# Patient Record
Sex: MALE | Race: WHITE | NOT HISPANIC OR LATINO | Employment: OTHER | URBAN - METROPOLITAN AREA
[De-identification: names, ages, dates, MRNs, and addresses within clinical notes are randomized per-mention and may not be internally consistent; named-entity substitution may affect disease eponyms.]

---

## 2023-12-14 ENCOUNTER — HOSPITAL ENCOUNTER (OUTPATIENT)
Dept: RADIOLOGY | Facility: CLINIC | Age: 78
Discharge: HOME | End: 2023-12-14
Payer: MEDICARE

## 2023-12-14 DIAGNOSIS — R97.20 ELEVATED PROSTATE SPECIFIC ANTIGEN (PSA): ICD-10-CM

## 2023-12-14 DIAGNOSIS — C61 MALIGNANT NEOPLASM OF PROSTATE (MULTI): ICD-10-CM

## 2023-12-14 PROCEDURE — 3430000001 HC RX 343 DIAGNOSTIC RADIOPHARMACEUTICALS: Performed by: UROLOGY

## 2023-12-14 PROCEDURE — 78815 PET IMAGE W/CT SKULL-THIGH: CPT | Mod: PET TUMOR SUBSQ TX STRATEGY | Performed by: STUDENT IN AN ORGANIZED HEALTH CARE EDUCATION/TRAINING PROGRAM

## 2023-12-14 PROCEDURE — 78815 PET IMAGE W/CT SKULL-THIGH: CPT | Mod: PS

## 2023-12-14 PROCEDURE — A9800 HC RX 343 DIAGNOSTIC RADIOPHARMACEUTICALS: HCPCS | Performed by: UROLOGY

## 2023-12-14 RX ADMIN — KIT FOR THE PREPARATION OF GALLIUM GA 68 GOZETOTIDE INJECTION 4.48 MILLICURIE: KIT INTRAVENOUS at 08:22

## 2024-03-08 DIAGNOSIS — K21.9 GASTRO-ESOPHAGEAL REFLUX DISEASE WITHOUT ESOPHAGITIS: ICD-10-CM

## 2024-03-11 RX ORDER — PANTOPRAZOLE SODIUM 40 MG/1
40 TABLET, DELAYED RELEASE ORAL DAILY
Qty: 100 TABLET | Refills: 2 | Status: SHIPPED | OUTPATIENT
Start: 2024-03-11

## 2025-01-13 ENCOUNTER — APPOINTMENT (OUTPATIENT)
Dept: RADIOLOGY | Facility: CLINIC | Age: 80
End: 2025-01-13
Payer: MEDICARE

## 2025-01-13 DIAGNOSIS — C61 PROSTATE CANCER (MULTI): ICD-10-CM

## 2025-01-17 ENCOUNTER — APPOINTMENT (OUTPATIENT)
Dept: RADIOLOGY | Facility: CLINIC | Age: 80
End: 2025-01-17
Payer: MEDICARE

## 2025-04-01 LAB — PSA SERPL-MCNC: 0.15 NG/ML

## 2025-04-01 NOTE — PROGRESS NOTES
Subjective   Patient ID: Wai Garland is a 80 y.o. male who presents for   HPI:  Are you experiencing:  Burning on urination --  Pain on urination  --  Urinary frequency --  Urinary urgency --  Urge incontinence --  Urinary stress incontinence  --   Number of pads used per day --  Enuresis --   Nocturia--  Hematuria --  Hesitancy --  Post void fullness --   Strength of your stream--    ROS:  General-- No C/O fever or chills  Head-- No C/O Dizziness  Eyes-- NO  C/O blurry or double vision  Ears-- No C/O hearing loss  Neck-- Supple  Chest-- No C/O pain or discomfort  Lungs-- No C/O shortness of breath  Abdomen-- No C/O  pain or discomfort, No nausea or vomiting  Back-- No C/O back pain or discomfort  Extremities-- No C/O swelling or pain    OBJECTIVE  General-- well-developed, well-nourished in NAD  Head-- normal cephalic, atraumatic  Eyes-- PERRL, EOM'S FROM, no jaundice  Neck-- Supple, without masses  Chest-- Normal bony structure  Abdomen-- soft, non tender, liver spleen not palpable. No suprapubic masses.  Back-- no flank masses palpable, no CVA tenderness on palpation or percussion  Lymph nodes-- No inguinal lymphadenopathy noted  Prostate-- +, firm, smooth, non-tender, without nodules  Testis-- both down, non-tender, without masses  Epididymis-- no masses palpable  Scrotum -- no hydrocele noted  Extremities -- Normal muscle mass and tone for the patients age  Neurological-- oriented times three    ASSESSMENT / PLAN  A:    P:  Mathieu Toney MD 04/01/25 2:42 PM

## 2025-04-04 ENCOUNTER — SPECIALTY PHARMACY (OUTPATIENT)
Dept: PHARMACY | Facility: CLINIC | Age: 80
End: 2025-04-04

## 2025-04-04 RX ORDER — LEUPROLIDE ACETATE 22.5 MG
22.5 KIT INTRAMUSCULAR
Qty: 22.5 MG | Refills: 3 | Status: SHIPPED | OUTPATIENT
Start: 2025-04-04 | End: 2025-04-07 | Stop reason: WASHOUT

## 2025-04-07 ENCOUNTER — SPECIALTY PHARMACY (OUTPATIENT)
Dept: PHARMACY | Facility: CLINIC | Age: 80
End: 2025-04-07

## 2025-04-07 DIAGNOSIS — C61 PROSTATE CANCER (MULTI): ICD-10-CM

## 2025-04-08 DIAGNOSIS — C61 PROSTATE CANCER (MULTI): ICD-10-CM

## 2025-04-08 RX ORDER — LEUPROLIDE ACETATE 22.5 MG
22.5 SYRINGE (EA) SUBCUTANEOUS
Qty: 1 EACH | Refills: 3 | Status: SHIPPED | OUTPATIENT
Start: 2025-04-08 | End: 2026-04-08

## 2025-04-09 ENCOUNTER — APPOINTMENT (OUTPATIENT)
Dept: UROLOGY | Facility: CLINIC | Age: 80
End: 2025-04-09
Payer: MEDICARE

## 2025-04-09 DIAGNOSIS — C61 PROSTATE CANCER (MULTI): Primary | ICD-10-CM

## 2025-04-19 NOTE — PROGRESS NOTES
Subjective   Patient ID: Wai Garland is a 80 y.o. male who presents for A F/U ON HIS PROSTATE CANCER.  PT S/P RADIATION THERAPY. PT HAS BEEN ON   ELIGARD  AND XTANDI HORMONE THERAPY.    PT IS ON GEMTESA 75 BID FOR HIS OVER ACTIVE BLADDER.   HPI:  Are you experiencing:  Burning on urination -- NO  Pain on urination  -- NO  Urinary frequency -- EVERY 2 HOURS  Urinary urgency --YES  Urge incontinence --YES  Urinary stress incontinence  -- YES  Number of pads used per day -- PT USES TOILET PAPER  Enuresis --  NO  Nocturia-- 3 X ON AVG  Hematuria --NO  Hesitancy --NO  Post void fullness -- OCC- PT WILL OCC DOUBLE VOID  Strength of your stream--  GOOD    ROS:  General-- No C/O fever or chills  Head-- No C/O Dizziness  Eyes-- NO  C/O blurry or double vision  Ears-- No C/O hearing loss  Neck-- Supple  Chest-- No C/O pain or discomfort  Lungs-- No C/O shortness of breath  Abdomen-- No C/O  pain or discomfort, No nausea or vomiting  Back-- No C/O back pain or discomfort  Extremities-- No C/O swelling or pain    OBJECTIVE  General-- well-developed, well-nourished in NAD  Head-- normal cephalic, atraumatic  Eyes-- PERRL, EOM'S FROM, no jaundice  Neck-- Supple, without masses  Chest-- Normal bony structure  Abdomen-- soft, non tender, liver spleen not palpable. No suprapubic masses. OBESE   Back-- no flank masses palpable, no CVA tenderness on palpation or percussion  Lymph nodes-- No inguinal lymphadenopathy noted  Prostate-- PT REFUSSED  Testis-- both down, non-tender, without masses--ATROPHIC BILATERAL  Epididymis-- no masses palpable  Scrotum -- no hydrocele noted  Extremities -- Normal muscle mass and tone for the patients age  Neurological-- oriented times three    PSA:  4/1/2025--0.15  9/18/2024--0.22  6/18/2024--0.64  5/15/2024--1.0  3/12/2024--3.25  12/5/2023--62.90  5/17/2023--2.82    12-14-23  PSMA PET SCAN:  IMPRESSION:  1. This study demonstrates metastatic prostate cancer to  retroperitoneal lymph nodes and  bones.  2. No Ga68 PSMA avidity in the prostate gland to suggest recurrent  disease  3. No Ga68 PSMA uptake in bilateral seminal vesicles to suggest  cancer infiltration  4. 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 Numerous Ga68 PSMA avid bone lesions throughout the axial and  appendicular skeleton, suggestive of widespread bone metastases.      ASSESSMENT / PLAN  A:  H/O PROSTATE CANCER TREATED WITH RADIATION THERAPY MANY YEARS AGO   PT HAS METASTATIC DISEASE TO THE RETROPERITONEAL LYMPH NODES   PT IS CURRENTLY ON TOTAL HORMONE ABLATION THERAPY - CAUSING EXTREME TIREDNESS  PSA HAS COME DOWN NICELY  P:  OBTAIN OLD RESULTS FROM [facility]  CONTINUE:  ELIGARD  EVERY THREE MONTHS  GEMTESA 150  MG / DAY-- 100 DAY SUPPLY WITH 3 REFILLS  XTANDI  80 MG / DAY--# 60 WITH 11 REFILLS  MEGACE 40 MG / DAY  F/U IN 3 MONTHS FOR A LUPRON AND PSA CK   Mathieu Toney MD 04/19/25 4:31 PM

## 2025-04-21 ENCOUNTER — APPOINTMENT (OUTPATIENT)
Age: 80
End: 2025-04-21
Payer: MEDICARE

## 2025-04-21 VITALS
BODY MASS INDEX: 30.34 KG/M2 | DIASTOLIC BLOOD PRESSURE: 79 MMHG | TEMPERATURE: 98 F | WEIGHT: 188 LBS | SYSTOLIC BLOOD PRESSURE: 129 MMHG | HEART RATE: 69 BPM

## 2025-04-21 DIAGNOSIS — R39.15 URGENCY OF MICTURITION: ICD-10-CM

## 2025-04-21 DIAGNOSIS — C61 PROSTATE CANCER (MULTI): ICD-10-CM

## 2025-04-21 DIAGNOSIS — N39.3 URINARY INCONTINENCE, STRESS, MALE: ICD-10-CM

## 2025-04-21 DIAGNOSIS — R35.0 FREQUENCY OF MICTURITION: Primary | ICD-10-CM

## 2025-04-21 DIAGNOSIS — N39.41 URGENCY INCONTINENCE: ICD-10-CM

## 2025-04-21 DIAGNOSIS — N32.81 OVERACTIVE BLADDER: ICD-10-CM

## 2025-04-21 DIAGNOSIS — R35.1 NOCTURIA: ICD-10-CM

## 2025-04-21 PROCEDURE — 96402 CHEMO HORMON ANTINEOPL SQ/IM: CPT | Performed by: UROLOGY

## 2025-04-21 PROCEDURE — 99214 OFFICE O/P EST MOD 30 MIN: CPT | Performed by: UROLOGY

## 2025-04-21 RX ORDER — ASCORBIC ACID 500 MG
1000 TABLET ORAL
COMMUNITY
Start: 2022-02-08

## 2025-04-21 RX ORDER — ENZALUTAMIDE 40 MG/1
TABLET ORAL
COMMUNITY
Start: 2025-03-25 | End: 2025-04-21 | Stop reason: SDUPTHER

## 2025-04-21 RX ORDER — CLOTRIMAZOLE AND BETAMETHASONE DIPROPIONATE 10; .64 MG/G; MG/G
CREAM TOPICAL
COMMUNITY
Start: 2020-11-04

## 2025-04-21 RX ORDER — ROSUVASTATIN CALCIUM 20 MG/1
20 TABLET, COATED ORAL DAILY
COMMUNITY

## 2025-04-21 RX ORDER — AMMONIUM LACTATE 12 G/100G
LOTION TOPICAL
COMMUNITY
Start: 2022-04-11

## 2025-04-21 RX ORDER — VIBEGRON 75 MG/1
2 TABLET, FILM COATED ORAL DAILY
Qty: 200 TABLET | Refills: 3 | Status: SHIPPED | OUTPATIENT
Start: 2025-04-21

## 2025-04-21 RX ORDER — AMLODIPINE BESYLATE 5 MG/1
5 TABLET ORAL 2 TIMES DAILY
COMMUNITY

## 2025-04-21 RX ORDER — NITROGLYCERIN 0.4 MG/1
TABLET SUBLINGUAL
COMMUNITY
Start: 2019-10-25

## 2025-04-21 RX ORDER — ACETAMINOPHEN 500 MG
5000 TABLET ORAL
COMMUNITY
Start: 2020-07-17

## 2025-04-21 RX ORDER — CARVEDILOL 12.5 MG/1
TABLET ORAL
COMMUNITY
Start: 2021-03-05

## 2025-04-21 RX ORDER — VIBEGRON 75 MG/1
2 TABLET, FILM COATED ORAL DAILY
COMMUNITY
End: 2025-04-21 | Stop reason: SDUPTHER

## 2025-04-21 RX ORDER — CYANOCOBALAMIN (VITAMIN B-12) 1000MCG/15
LIQUID (ML) ORAL
COMMUNITY
Start: 2023-06-26

## 2025-04-21 RX ORDER — MULTIVIT-MIN/FA/LYCOPEN/LUTEIN .4-300-25
TABLET ORAL
COMMUNITY
Start: 2020-11-04

## 2025-04-21 RX ORDER — VALSARTAN AND HYDROCHLOROTHIAZIDE 160; 25 MG/1; MG/1
2 TABLET ORAL DAILY
COMMUNITY

## 2025-04-21 RX ORDER — ENZALUTAMIDE 40 MG/1
80 TABLET ORAL DAILY
Qty: 60 TABLET | Refills: 0 | Status: SHIPPED | OUTPATIENT
Start: 2025-04-21

## 2025-04-21 RX ORDER — DOCUSATE SODIUM 60 MG/15ML
60 SYRUP ORAL DAILY
COMMUNITY

## 2025-04-21 RX ORDER — POTASSIUM CHLORIDE 1500 MG/1
20 TABLET, EXTENDED RELEASE ORAL DAILY
COMMUNITY

## 2025-04-21 RX ORDER — CLOPIDOGREL BISULFATE 75 MG/1
75 TABLET ORAL DAILY
COMMUNITY

## 2025-04-21 RX ORDER — MEGESTROL ACETATE 20 MG/1
TABLET ORAL DAILY
COMMUNITY
End: 2025-04-21 | Stop reason: SDUPTHER

## 2025-04-21 RX ORDER — MEGESTROL ACETATE 20 MG/1
40 TABLET ORAL 2 TIMES DAILY
Qty: 200 TABLET | Refills: 3 | Status: SHIPPED | OUTPATIENT
Start: 2025-04-21

## 2025-04-21 ASSESSMENT — ENCOUNTER SYMPTOMS
LOSS OF SENSATION IN FEET: 1
DEPRESSION: 1
OCCASIONAL FEELINGS OF UNSTEADINESS: 0

## 2025-04-21 NOTE — LETTER
April 22, 2025     Cedrick Martinez MD  [address] [id number]    Patient: Wai Garland   YOB: 1945   Date of Visit: 4/21/2025       Dear Dr. Cedrick Martinez MD:    Thank you for referring Wai Garland to me for evaluation. Below are my notes for this consultation.  If you have questions, please do not hesitate to call me. I look forward to following your patient along with you.       Sincerely,     Mathieu Toney MD      CC: No Recipients  ______________________________________________________________________________________    Subjective  Patient ID: Wai Garland is a 80 y.o. male who presents for A F/U ON HIS PROSTATE CANCER.  PT S/P RADIATION THERAPY. PT HAS BEEN ON   ELIGARD  AND XTANDI HORMONE THERAPY.    PT IS ON GEMTESA 75 BID FOR HIS OVER ACTIVE BLADDER.   HPI:  Are you experiencing:  Burning on urination -- NO  Pain on urination  -- NO  Urinary frequency -- EVERY 2 HOURS  Urinary urgency --YES  Urge incontinence --YES  Urinary stress incontinence  -- YES  Number of pads used per day -- PT USES TOILET PAPER  Enuresis --  NO  Nocturia-- 3 X ON AVG  Hematuria --NO  Hesitancy --NO  Post void fullness -- OCC- PT WILL OCC DOUBLE VOID  Strength of your stream--  GOOD    ROS:  General-- No C/O fever or chills  Head-- No C/O Dizziness  Eyes-- NO  C/O blurry or double vision  Ears-- No C/O hearing loss  Neck-- Supple  Chest-- No C/O pain or discomfort  Lungs-- No C/O shortness of breath  Abdomen-- No C/O  pain or discomfort, No nausea or vomiting  Back-- No C/O back pain or discomfort  Extremities-- No C/O swelling or pain    OBJECTIVE  General-- well-developed, well-nourished in NAD  Head-- normal cephalic, atraumatic  Eyes-- PERRL, EOM'S FROM, no jaundice  Neck-- Supple, without masses  Chest-- Normal bony structure  Abdomen-- soft, non tender, liver spleen not palpable. No suprapubic masses. OBESE   Back-- no flank masses palpable, no CVA tenderness on palpation or  percussion  Lymph nodes-- No inguinal lymphadenopathy noted  Prostate-- PT REFUSSED  Testis-- both down, non-tender, without masses--ATROPHIC BILATERAL  Epididymis-- no masses palpable  Scrotum -- no hydrocele noted  Extremities -- Normal muscle mass and tone for the patients age  Neurological-- oriented times three    PSA:  4/1/2025--0.15  9/18/2024--0.22  6/18/2024--0.64  5/15/2024--1.0  3/12/2024--3.25  12/5/2023--62.90  5/17/2023--2.82    12-14-23  PSMA PET SCAN:  IMPRESSION:  1. This study demonstrates metastatic prostate cancer to  retroperitoneal lymph nodes and bones.  2. No Ga68 PSMA avidity in the prostate gland to suggest recurrent  disease  3. No Ga68 PSMA uptake in bilateral seminal vesicles to suggest  cancer infiltration  4. 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 Numerous Ga68 PSMA avid bone lesions throughout the axial and  appendicular skeleton, suggestive of widespread bone metastases.      ASSESSMENT / PLAN  A:  H/O PROSTATE CANCER TREATED WITH RADIATION THERAPY MANY YEARS AGO   PT HAS METASTATIC DISEASE TO THE RETROPERITONEAL LYMPH NODES   PT IS CURRENTLY ON TOTAL HORMONE ABLATION THERAPY - CAUSING EXTREME TIREDNESS  PSA HAS COME DOWN NICELY  P:  OBTAIN OLD RESULTS FROM [facility]  CONTINUE:  ELIGARD  EVERY THREE MONTHS  GEMTESA 150  MG / DAY-- 100 DAY SUPPLY WITH 3 REFILLS  XTANDI  80 MG / DAY--# 60 WITH 11 REFILLS  MEGACE 40 MG / DAY  F/U IN 3 MONTHS FOR A LUPRON AND PSA CK   Mathieu Toney MD 04/19/25 4:31 PM

## 2025-05-12 DIAGNOSIS — C61 PROSTATE CANCER (MULTI): ICD-10-CM

## 2025-05-12 RX ORDER — ENZALUTAMIDE 40 MG/1
TABLET ORAL
Qty: 60 TABLET | Refills: 0 | Status: SHIPPED | OUTPATIENT
Start: 2025-05-12

## 2025-06-12 DIAGNOSIS — C61 PROSTATE CANCER (MULTI): ICD-10-CM

## 2025-06-16 RX ORDER — ENZALUTAMIDE 40 MG/1
TABLET ORAL
Qty: 60 TABLET | Refills: 11 | Status: SHIPPED | OUTPATIENT
Start: 2025-06-16

## 2025-07-22 LAB — PSA SERPL-MCNC: 0.1 NG/ML

## 2025-07-26 NOTE — PROGRESS NOTES
Subjective   Patient ID: Wai Garland is a 80 y.o. male who presents for A F/U ON HIS PROSTATE CANCER.  PT S/P RADIATION THERAPY. PT HAS BEEN ON   ELIGARD  AND XTANDI HORMONE THERAPY.    PT IS ON GEMTESA 75 BID FOR HIS OVER ACTIVE BLADDER.     HPI:  Are you experiencing:  Burning on urination -- NO  Pain on urination  -- NO  Urinary frequency -- EVERY 2 HOURS  Urinary urgency --YES  Urge incontinence --YES  Urinary stress incontinence  -- YES  Number of pads used per day -- PT USES TOILET PAPER  Enuresis --  NO  Nocturia-- 3 X ON AVG  Hematuria --NO  Hesitancy --NO  Post void fullness -- OCC- PT WILL OCC DOUBLE VOID  Strength of your stream--  GOOD     PSA:  7/21/2025-- 0.10  4/1/2025--0.15  9/18/2024--0.22  6/18/2024--0.64  5/15/2024--1.0  3/12/2024--3.25  12/5/2023--62.90  5/17/2023--2.82     12-14-23  PSMA PET SCAN:  IMPRESSION:  1. This study demonstrates metastatic prostate cancer to  retroperitoneal lymph nodes and bones.  2. No Ga68 PSMA avidity in the prostate gland to suggest recurrent  disease  3. No Ga68 PSMA uptake in bilateral seminal vesicles to suggest  cancer infiltration  4. 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 Numerous Ga68 PSMA avid bone lesions throughout the axial and  appendicular skeleton, suggestive of widespread bone metastases.        ASSESSMENT / PLAN  A:  RECORDS FROM [facility] REVIEWED   H/O PROSTATE CANCER TREATED WITH RADIATION THERAPY MANY YEARS AGO   PT HAS METASTATIC DISEASE TO THE RETROPERITONEAL LYMPH NODES   PT IS CURRENTLY ON TOTAL HORMONE ABLATION THERAPY - CAUSING EXTREME TIREDNESS  PSA HAS COME DOWN NICELY  PT IS EXTREMELY WEAK -- HAVING A VERY HARD TIME WALKING-- I AM NOT SURE IF THIS IS FROM HIS THERAPY OR IS THERE SOMETHING PRESSING ON THE L-S SPINAL CORD  ABOVE DISCUSSED IN DETAIL (  ALONG WITH OPTIONS OF FURTHER EVALUATION AND THERAPY  )  WITH THE PT  AND DAUGHTER  ALL QUESTIONS ANSWERED   P:  MRI OF  THE L-S SPINE TO LOOK FOR SPINAL CORD COMPRESSION-- DAUGHTER TO CALL FOR THE RESULTS   IF THE ABOVE IS NORMAL WILL GET THE PT  IN FOR PHYSICAL THERAPY   CONTINUE:  ELIGARD  EVERY THREE MONTHS  GEMTESA 150  MG / DAY-- 100 DAY SUPPLY WITH 3 REFILLS  XTANDI  80 MG / DAY--# 60 WITH 11 REFILLS ( IF THE MRI OF THE SPINE IS GOOD WILL STOP THE XTANDI BECAUSE I BELIEVE THIS IS WHAT IS CAUSING HIS SEVERE WEAKNESS AND LOSS OF ENERGY . )  MEGACE 40 MG / DAY  F/U IN THREE MONTHS FOR A PSA AND ELIGARD INJECTION.     Mathieu Toney MD 07/26/25 2:26 PM

## 2025-07-28 ENCOUNTER — APPOINTMENT (OUTPATIENT)
Age: 80
End: 2025-07-28
Payer: MEDICARE

## 2025-07-28 VITALS — HEART RATE: 63 BPM | SYSTOLIC BLOOD PRESSURE: 155 MMHG | DIASTOLIC BLOOD PRESSURE: 84 MMHG

## 2025-07-28 DIAGNOSIS — N32.81 OVERACTIVE BLADDER: ICD-10-CM

## 2025-07-28 DIAGNOSIS — R35.0 FREQUENCY OF MICTURITION: ICD-10-CM

## 2025-07-28 DIAGNOSIS — N39.41 URGENCY INCONTINENCE: ICD-10-CM

## 2025-07-28 DIAGNOSIS — N39.3 URINARY INCONTINENCE, STRESS, MALE: ICD-10-CM

## 2025-07-28 DIAGNOSIS — R35.1 NOCTURIA: ICD-10-CM

## 2025-07-28 DIAGNOSIS — R39.15 URGENCY OF MICTURITION: ICD-10-CM

## 2025-07-28 DIAGNOSIS — C61 PROSTATE CANCER (MULTI): Primary | ICD-10-CM

## 2025-07-28 PROCEDURE — 99213 OFFICE O/P EST LOW 20 MIN: CPT | Performed by: UROLOGY

## 2025-07-28 PROCEDURE — 96402 CHEMO HORMON ANTINEOPL SQ/IM: CPT | Performed by: UROLOGY

## 2025-07-28 RX ORDER — VIBEGRON 75 MG/1
2 TABLET, FILM COATED ORAL DAILY
Qty: 200 TABLET | Refills: 3 | Status: SHIPPED | OUTPATIENT
Start: 2025-07-28

## 2025-07-28 NOTE — LETTER
July 29, 2025     Cedrick Martinez MD  [address] [id number]    Patient: Wai Garland   YOB: 1945   Date of Visit: 7/28/2025       Dear Dr. Cedrick Martinez MD:    Thank you for referring Wai Garland to me for evaluation. Below are my notes for this consultation.  If you have questions, please do not hesitate to call me. I look forward to following your patient along with you.       Sincerely,     Mathieu Toney MD      CC: No Recipients  ______________________________________________________________________________________    Subjective  Patient ID: Wai Garland is a 80 y.o. male who presents for A F/U ON HIS PROSTATE CANCER.  PT S/P RADIATION THERAPY. PT HAS BEEN ON   ELIGARD  AND XTANDI HORMONE THERAPY.    PT IS ON GEMTESA 75 BID FOR HIS OVER ACTIVE BLADDER.     HPI:  Are you experiencing:  Burning on urination -- NO  Pain on urination  -- NO  Urinary frequency -- EVERY 2 HOURS  Urinary urgency --YES  Urge incontinence --YES  Urinary stress incontinence  -- YES  Number of pads used per day -- PT USES TOILET PAPER  Enuresis --  NO  Nocturia-- 3 X ON AVG  Hematuria --NO  Hesitancy --NO  Post void fullness -- OCC- PT WILL OCC DOUBLE VOID  Strength of your stream--  GOOD     PSA:  7/21/2025-- 0.10  4/1/2025--0.15  9/18/2024--0.22  6/18/2024--0.64  5/15/2024--1.0  3/12/2024--3.25  12/5/2023--62.90  5/17/2023--2.82     12-14-23  PSMA PET SCAN:  IMPRESSION:  1. This study demonstrates metastatic prostate cancer to  retroperitoneal lymph nodes and bones.  2. No Ga68 PSMA avidity in the prostate gland to suggest recurrent  disease  3. No Ga68 PSMA uptake in bilateral seminal vesicles to suggest  cancer infiltration  4. 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 Numerous Ga68 PSMA avid bone lesions throughout the axial and  appendicular skeleton, suggestive of widespread bone metastases.        ASSESSMENT /  PLAN  A:  RECORDS FROM [facility] REVIEWED   H/O PROSTATE CANCER TREATED WITH RADIATION THERAPY MANY YEARS AGO   PT HAS METASTATIC DISEASE TO THE RETROPERITONEAL LYMPH NODES   PT IS CURRENTLY ON TOTAL HORMONE ABLATION THERAPY - CAUSING EXTREME TIREDNESS  PSA HAS COME DOWN NICELY  PT IS EXTREMELY WEAK -- HAVING A VERY HARD TIME WALKING-- I AM NOT SURE IF THIS IS FROM HIS THERAPY OR IS THERE SOMETHING PRESSING ON THE L-S SPINAL CORD  ABOVE DISCUSSED IN DETAIL (  ALONG WITH OPTIONS OF FURTHER EVALUATION AND THERAPY  )  WITH THE PT  AND DAUGHTER  ALL QUESTIONS ANSWERED   P:  MRI OF THE L-S SPINE TO LOOK FOR SPINAL CORD COMPRESSION-- DAUGHTER TO CALL FOR THE RESULTS   IF THE ABOVE IS NORMAL WILL GET THE PT  IN FOR PHYSICAL THERAPY   CONTINUE:  ELIGARD  EVERY THREE MONTHS  GEMTESA 150  MG / DAY-- 100 DAY SUPPLY WITH 3 REFILLS  XTANDI  80 MG / DAY--# 60 WITH 11 REFILLS ( IF THE MRI OF THE SPINE IS GOOD WILL STOP THE XTANDI BECAUSE I BELIEVE THIS IS WHAT IS CAUSING HIS SEVERE WEAKNESS AND LOSS OF ENERGY . )  MEGACE 40 MG / DAY  F/U IN THREE MONTHS FOR A PSA AND ELIGARD INJECTION.     Mathieu Toney MD 07/26/25 2:26 PM

## 2025-08-01 ENCOUNTER — HOSPITAL ENCOUNTER (OUTPATIENT)
Dept: RADIOLOGY | Facility: HOSPITAL | Age: 80
Discharge: HOME | End: 2025-08-01
Payer: MEDICARE

## 2025-08-01 DIAGNOSIS — N32.81 OVERACTIVE BLADDER: ICD-10-CM

## 2025-08-01 DIAGNOSIS — C61 PROSTATE CANCER (MULTI): ICD-10-CM

## 2025-08-01 PROCEDURE — 72148 MRI LUMBAR SPINE W/O DYE: CPT

## 2025-08-11 DIAGNOSIS — R53.1 GENERALIZED WEAKNESS: ICD-10-CM

## 2025-09-03 ENCOUNTER — EVALUATION (OUTPATIENT)
Dept: PHYSICAL THERAPY | Facility: CLINIC | Age: 80
End: 2025-09-03
Payer: MEDICARE

## 2025-09-03 DIAGNOSIS — Z74.09 IMPAIRED FUNCTIONAL MOBILITY, BALANCE, GAIT, AND ENDURANCE: Primary | ICD-10-CM

## 2025-09-03 PROCEDURE — 97161 PT EVAL LOW COMPLEX 20 MIN: CPT | Mod: GP

## 2025-10-22 ENCOUNTER — APPOINTMENT (OUTPATIENT)
Age: 80
End: 2025-10-22
Payer: MEDICARE

## 2025-11-03 ENCOUNTER — APPOINTMENT (OUTPATIENT)
Age: 80
End: 2025-11-03
Payer: MEDICARE